# Patient Record
(demographics unavailable — no encounter records)

---

## 2025-06-24 NOTE — PHYSICAL EXAM
[Well Developed] : well developed [Well Nourished] : well nourished [No Acute Distress] : no acute distress [Normal Conjunctiva] : normal conjunctiva [Normal Venous Pressure] : normal venous pressure [No Carotid Bruit] : no carotid bruit [Normal S1, S2] : normal S1, S2 [No Rub] : no rub [No Gallop] : no gallop [Clear Lung Fields] : clear lung fields [Good Air Entry] : good air entry [No Respiratory Distress] : no respiratory distress  [Soft] : abdomen soft [Non Tender] : non-tender [No Masses/organomegaly] : no masses/organomegaly [Normal Bowel Sounds] : normal bowel sounds [Normal Gait] : normal gait [No Edema] : no edema [No Cyanosis] : no cyanosis [No Clubbing] : no clubbing [No Varicosities] : no varicosities [No Rash] : no rash [No Skin Lesions] : no skin lesions [Moves all extremities] : moves all extremities [No Focal Deficits] : no focal deficits [Normal Speech] : normal speech [Alert and Oriented] : alert and oriented [Normal memory] : normal memory [de-identified] : +I/VI Murmur

## 2025-06-24 NOTE — HISTORY OF PRESENT ILLNESS
[FreeTextEntry1] : Dear  Krupa,   I had the pleasure of seeing your patient PRISCILA SONI for Cardiometabolic evaluation.   As you know, she  is a Pleasant, 54 year old with a past medical history of Hyperlipidemia, Obesity, T2DM (A1c 6.6 2/2025 in [facility]) =============== =============== Hyperlipidemia, Obesity, T2DM (A1c 6.6 2/2025 in [facility]) - Prefers zepbound over mounjaro despite T2DM - Send to [facility]  - Start GLP1RA  Mn Systolic Murmur (holosystolic) I.VI apical c/f MR - TTE    CC: Heart Issues - Patient reports no chest pain, no localization to the sternum, no radiation to the neck/jaw, no alleviating nor worsening precipitants to CP, no assoc symptoms to CP - Patient notes no associated SOB/Palps/Leg swelling - Reports No associated F/C/N/V/Headaches - Reports Normal Exercise Tolerance - Reports no medication changes - Reports normal mood/quality of life - Reports no associated midnight awakenings from cP - Reports no diet changes - Reports no associated body aches - Reports no recent colds/viruses - Recent labs/imaging reviewed - Relevant Family history reviewed Mother/Father - CV Risk Assessment for 10 Year ACC/AHA Pooled Risk Cohort Equation places this person at < 7.5% Risk of ASCVD

## 2025-06-24 NOTE — DISCUSSION/SUMMARY
[EKG obtained to assist in diagnosis and management of assessed problem(s)] : EKG obtained to assist in diagnosis and management of assessed problem(s) [FreeTextEntry1] : In summary   Jf, 54 year old with a past medical history of Hyperlipidemia, Obesity, T2DM (A1c 6.6 2/2025 in [facility]) =============== =============== Hyperlipidemia, Obesity, T2DM (A1c 6.6 2/2025 in [facility]) - Prefers zepbound over mounjaro despite T2DM - Send to Vivo  - Start GLP1RA    Krupa, kind thanks for the referral.   Randall Kapadia MD [facility] LUIS ANGEL DOLAN Director, Preventive Cardiology [facility] [place]                                                                                                                                                                                                                                                 --                                                                                                                                                                                                                                                                                                                                                                                                                                                                                                                                                                                                                                                                                                                            -----------                     ----------- 15 minutes was provided for preventive counseling on healthy diet, weight maintenance, CV risk reduction. Specifically, and separate from other cardiovascular evaluation and treatment, we discussed h willingness to focus  on lifestyle changes, particularly dietary; including greater consumption of vegetables, fruits over saturated fats. We discussed appropriate follow up to monitor progress on these areas.     -                                                                                                             -                         -

## 2025-06-24 NOTE — DISCUSSION/SUMMARY
[EKG obtained to assist in diagnosis and management of assessed problem(s)] : EKG obtained to assist in diagnosis and management of assessed problem(s) [FreeTextEntry1] : In summary   Jf, 54 year old with a past medical history of Hyperlipidemia, Obesity, T2DM (A1c 6.6 2/2025 in [facility]) =============== =============== Hyperlipidemia, Obesity, T2DM (A1c 6.6 2/2025 in [facility]) - Prefers zepbound over mounjaro despite T2DM - Send to Vivo  - Start GLP1RA    Krupa, kind thanks for the referral.   Randall Kapadia MD [facility] LUIS ANGEL DOLAN Director, Preventive Cardiology [facility] Cardi[facility] [place]                                                                                                                                                                                                                                                 --                                                                                                                                                                                                                                                                                                                                                                                                                                                                                                                                                                                                                                                                                                                            -----------                     ----------- 15 minutes was provided for preventive counseling on healthy diet, weight maintenance, CV risk reduction. Specifically, and separate from other cardiovascular evaluation and treatment, we discussed h willingness to focus  on lifestyle changes, particularly dietary; including greater consumption of vegetables, fruits over saturated fats. We discussed appropriate follow up to monitor progress on these areas.     -                                                                                                             -                         -

## 2025-06-24 NOTE — PHYSICAL EXAM
[Well Developed] : well developed [Well Nourished] : well nourished [No Acute Distress] : no acute distress [Normal Conjunctiva] : normal conjunctiva [Normal Venous Pressure] : normal venous pressure [No Carotid Bruit] : no carotid bruit [Normal S1, S2] : normal S1, S2 [No Rub] : no rub [No Gallop] : no gallop [Clear Lung Fields] : clear lung fields [Good Air Entry] : good air entry [No Respiratory Distress] : no respiratory distress  [Soft] : abdomen soft [Non Tender] : non-tender [No Masses/organomegaly] : no masses/organomegaly [Normal Bowel Sounds] : normal bowel sounds [Normal Gait] : normal gait [No Edema] : no edema [No Cyanosis] : no cyanosis [No Clubbing] : no clubbing [No Varicosities] : no varicosities [No Rash] : no rash [No Skin Lesions] : no skin lesions [Moves all extremities] : moves all extremities [No Focal Deficits] : no focal deficits [Normal Speech] : normal speech [Alert and Oriented] : alert and oriented [Normal memory] : normal memory [de-identified] : +I/VI Murmur

## 2025-06-24 NOTE — HISTORY OF PRESENT ILLNESS
[FreeTextEntry1] : Dear  Krupa,   I had the pleasure of seeing your patient PRISCILA SONI for Cardiometabolic evaluation.   As you know, she  is a Pleasant, 54 year old with a past medical history of Hyperlipidemia, Obesity, T2DM (A1c 6.6 2/2025 in [facility]) =============== =============== Hyperlipidemia, Obesity, T2DM (A1c 6.6 2/2025 in [facility]) - Prefers zepbound over mounjaro despite T2DM - Send to Vivo  - Start GLP1RA  Mn Systolic Murmur (holosystolic) I.VI apical c/f MR - TTE    CC: Heart Issues - Patient reports no chest pain, no localization to the sternum, no radiation to the neck/jaw, no alleviating nor worsening precipitants to CP, no assoc symptoms to CP - Patient notes no associated SOB/Palps/Leg swelling - Reports No associated F/C/N/V/Headaches - Reports Normal Exercise Tolerance - Reports no medication changes - Reports normal mood/quality of life - Reports no associated midnight awakenings from cP - Reports no diet changes - Reports no associated body aches - Reports no recent colds/viruses - Recent labs/imaging reviewed - Relevant Family history reviewed Mother/Father - CV Risk Assessment for 10 Year ACC/AHA Pooled Risk Cohort Equation places this person at < 7.5% Risk of ASCVD

## 2025-07-24 NOTE — HISTORY OF PRESENT ILLNESS
[FreeTextEntry1] : Ms. PRISCILA SONI  is a 54-year-old female who has a past medical history significant for HLD, obesity, T2DM (A1C 6.6, 2/2025 in [facility])  who presents via telehealth for a follow up evaluation. Patient feels generally well today. Denies SOB, chest pain, palpitations, dizziness, and syncope.      ================ ================ 7/2025 HLD, Obesity BMI 31.62, T2DM Prefers Zepbound over Mounjaro Zepbound 2.5 mg x 4 doses  - No side effects, feels well - Appetite suppression present, smaller portions - Following with [facility]UIRRE- increasing protein (protein shakes) - No changes in weight but reports inches coming of. - Will increase dose of Zepbound to 5 mg to further aid in weight loss.

## 2025-07-24 NOTE — DISCUSSION/SUMMARY
[FreeTextEntry1] : obesity, T2DM, HLD  - Patient to start Zepbound 5 mg once weekly for four weeks. All side effects reviewed. Questions and concerns addressed.  - A detailed discussion took place about the importance of CV risk reduction - The patient understands the importance of incorporating moderate aerobic exercise, 4 times/week for 40 minutes to reduce risk of CV disease. - A detailed discussion of lifestyle modification was done today. Patient understands the importance of a heart healthy diet and incorporating veggies/legumes/fruits/whole grains and limiting processed foods, sugars and carbs in their diet. - patient understands that stress reduction along with good sleep hygiene will help aid in weight loss - Follow up in 4 weeks  - The patient has a good understanding of the diagnosis, treatment plan and lifestyle modification. she will contact me at the office for any questions with their care or any changes in their health status.  Case discussed with Dr Randall Kapadia.

## 2025-07-24 NOTE — HISTORY OF PRESENT ILLNESS
[FreeTextEntry1] : Ms. PRISCILA SONI  is a 54-year-old female who has a past medical history significant for HLD, obesity, T2DM (A1C 6.6, 2/2025 in [facility])  who presents via telehealth for a follow up evaluation. Patient feels generally well today. Denies SOB, chest pain, palpitations, dizziness, and syncope.      ================ ================ 7/2025 HLD, Obesity BMI 31.62, T2DM Prefers Zepbound over Mounjaro Zepbound 2.5 mg x 4 doses  - No side effects, feels well - Appetite suppression present, smaller portions - Following with [facility]- increasing protein (protein shakes) - No changes in weight but reports inches coming of. - Will increase dose of Zepbound to 5 mg to further aid in weight loss.

## 2025-07-24 NOTE — REASON FOR VISIT
[FreeTextEntry1] :  Telehealth visit today on [facility]. Telehealth was done using 2 way audio. Patient was at home. Provider was in office. Both patient and provider in [facility]. Consent was provided by patient. Only patient and provider in the visit.